# Patient Record
Sex: FEMALE | Race: OTHER | HISPANIC OR LATINO
[De-identification: names, ages, dates, MRNs, and addresses within clinical notes are randomized per-mention and may not be internally consistent; named-entity substitution may affect disease eponyms.]

---

## 2020-01-29 ENCOUNTER — RESULT REVIEW (OUTPATIENT)
Age: 70
End: 2020-01-29

## 2020-02-20 ENCOUNTER — APPOINTMENT (OUTPATIENT)
Dept: SURGERY | Facility: CLINIC | Age: 70
End: 2020-02-20
Payer: MEDICARE

## 2020-02-20 VITALS
BODY MASS INDEX: 22.99 KG/M2 | HEIGHT: 65 IN | OXYGEN SATURATION: 98 % | WEIGHT: 138 LBS | HEART RATE: 105 BPM | TEMPERATURE: 98.1 F | DIASTOLIC BLOOD PRESSURE: 84 MMHG | SYSTOLIC BLOOD PRESSURE: 145 MMHG

## 2020-02-20 DIAGNOSIS — Z78.9 OTHER SPECIFIED HEALTH STATUS: ICD-10-CM

## 2020-02-20 DIAGNOSIS — Z82.0 FAMILY HISTORY OF EPILEPSY AND OTHER DISEASES OF THE NERVOUS SYSTEM: ICD-10-CM

## 2020-02-20 DIAGNOSIS — N63.20 UNSPECIFIED LUMP IN THE LEFT BREAST, UNSPECIFIED QUADRANT: ICD-10-CM

## 2020-02-20 DIAGNOSIS — E78.00 PURE HYPERCHOLESTEROLEMIA, UNSPECIFIED: ICD-10-CM

## 2020-02-20 DIAGNOSIS — E07.9 DISORDER OF THYROID, UNSPECIFIED: ICD-10-CM

## 2020-02-20 DIAGNOSIS — Z82.49 FAMILY HISTORY OF ISCHEMIC HEART DISEASE AND OTHER DISEASES OF THE CIRCULATORY SYSTEM: ICD-10-CM

## 2020-02-20 PROBLEM — Z00.00 ENCOUNTER FOR PREVENTIVE HEALTH EXAMINATION: Status: ACTIVE | Noted: 2020-02-20

## 2020-02-20 PROCEDURE — 99203 OFFICE O/P NEW LOW 30 MIN: CPT

## 2020-02-20 RX ORDER — CALCIUM CARBONATE-CHOLECALCIFEROL TAB 250 MG-125 UNIT 250-125 MG-UNIT
TAB ORAL
Refills: 0 | Status: ACTIVE | COMMUNITY

## 2020-02-20 RX ORDER — LEVOTHYROXINE SODIUM 137 UG/1
TABLET ORAL
Refills: 0 | Status: ACTIVE | COMMUNITY

## 2020-02-20 RX ORDER — CHROMIUM 200 MCG
TABLET ORAL
Refills: 0 | Status: ACTIVE | COMMUNITY

## 2020-02-20 NOTE — HISTORY OF PRESENT ILLNESS
[de-identified] : Patient is a 69 year  year-old female  who was referred by Dr. Ilana Wei with the chief complaint of having a Left  breast mass for about 2-3 months. She  denies any trauma and She has no nipple discharge. She  denies any fever, night sweats or loss of appetite.    There is no family history of breast carcinoma.   Menarche at age 14 -0 para-0, and her last menstrual period was when she was 51 years old. Patient is on no hormonal replacement therapy.  A sono-guided biopsy was done on 2020 which the pathology stated  intraductal papilloma. \par

## 2020-02-20 NOTE — PHYSICAL EXAM
[Alert] : alert [Oriented to Person] : oriented to person [Oriented to Time] : oriented to time [Oriented to Place] : oriented to place [Calm] : calm [de-identified] :   anicteric.  Nasal mucosa pink, septum midline. Oral mucosa pink.  Tongue midline, Pharynx without exudates.\par   [de-identified] : She  is alert, well-groomed, and cheerful.\par   [de-identified] :  Neck supple. Trachea midline. Thyroid isthmus barely palpable, lobes not felt.\par   [de-identified] :  Left breast Bx site indurated. some ecchymosis around. No chest deformity. Breast are symmetric, Normal contours. No nodules, masses, tenderness, or axillary or supraclavicular  adenopathy. No nipple discharge. no skin retraction \par

## 2020-02-20 NOTE — PLAN
[FreeTextEntry1] : Ms. TONY  was told significance of findings, options, risks and benefits were explained.  Informed consent for excision of the left breast mass with spot localization and potential risks, benefits and alternatives (surgical options were discussed including non-surgical options or the option of no surgery) to the planned surgery were discussed in depth.  All surgical options were discussed including non-surgical treatments.  She wishes to proceed with surgery.  We will plan for surgery on at the next available date, pending any required insurance pre-certification or pre-approval. She agrees to obtain any necessary pre-operative evaluations and testing prior to surgery.\par Patient advised to seek immediate medical attention with any acute change in symptoms or with the development of any new or worsening symptoms.  Patient's questions and concerns addressed to patient's satisfaction, and patient verbalized an understanding of the information discussed.\par \par

## 2020-02-20 NOTE — DATA REVIEWED
[FreeTextEntry1] : JEANCARLOS TONY                      1\par \par \par \par Surgical Final Report\par \par \par \par \par Final Diagnosis\par Breast nodule, left, retroareolar, sonoguided core biopsy\par - Intraductal papilloma\par \par Verified by: Luis Enrique Dahl M.D.\par (Electronic Signature)\par Reported on: 01/31/20 13:48 EST, [address] [facility],\[place] [address]\par Phone: [phone]   Fax: [phone]\par _________________________________________________________________\par \par Clinical Information\par N63.0\par Sonoguided core biopsy of left breast retroareolar nodule

## 2020-02-20 NOTE — CONSULT LETTER
[Dear  ___] : Dear  [unfilled], [Please see my note below.] : Please see my note below. [Consult Letter:] : I had the pleasure of evaluating your patient, [unfilled]. [Sincerely,] : Sincerely, [FreeTextEntry3] : Isra Brown MD, FACS

## 2020-03-05 ENCOUNTER — OUTPATIENT (OUTPATIENT)
Dept: OUTPATIENT SERVICES | Facility: HOSPITAL | Age: 70
LOS: 1 days | End: 2020-03-05

## 2020-03-05 DIAGNOSIS — N63.0 UNSPECIFIED LUMP IN UNSPECIFIED BREAST: ICD-10-CM

## 2020-03-05 DIAGNOSIS — D36.9 BENIGN NEOPLASM, UNSPECIFIED SITE: ICD-10-CM

## 2020-03-10 ENCOUNTER — TRANSCRIPTION ENCOUNTER (OUTPATIENT)
Age: 70
End: 2020-03-10

## 2020-03-10 RX ORDER — CHLORHEXIDINE GLUCONATE 213 G/1000ML
1 SOLUTION TOPICAL ONCE
Refills: 0 | Status: DISCONTINUED | OUTPATIENT
Start: 2020-03-11 | End: 2020-03-19

## 2020-03-10 RX ORDER — SODIUM CHLORIDE 9 MG/ML
3 INJECTION INTRAMUSCULAR; INTRAVENOUS; SUBCUTANEOUS EVERY 8 HOURS
Refills: 0 | Status: DISCONTINUED | OUTPATIENT
Start: 2020-03-11 | End: 2020-03-11

## 2020-03-11 ENCOUNTER — OUTPATIENT (OUTPATIENT)
Dept: OUTPATIENT SERVICES | Facility: HOSPITAL | Age: 70
LOS: 1 days | End: 2020-03-11
Payer: COMMERCIAL

## 2020-03-11 ENCOUNTER — APPOINTMENT (OUTPATIENT)
Dept: SURGERY | Facility: HOSPITAL | Age: 70
End: 2020-03-11
Payer: MEDICARE

## 2020-03-11 ENCOUNTER — RESULT REVIEW (OUTPATIENT)
Age: 70
End: 2020-03-11

## 2020-03-11 VITALS
HEIGHT: 65 IN | WEIGHT: 136.91 LBS | DIASTOLIC BLOOD PRESSURE: 79 MMHG | OXYGEN SATURATION: 99 % | RESPIRATION RATE: 16 BRPM | TEMPERATURE: 98 F | SYSTOLIC BLOOD PRESSURE: 147 MMHG | HEART RATE: 89 BPM

## 2020-03-11 VITALS
SYSTOLIC BLOOD PRESSURE: 139 MMHG | TEMPERATURE: 98 F | RESPIRATION RATE: 16 BRPM | HEART RATE: 83 BPM | OXYGEN SATURATION: 100 % | DIASTOLIC BLOOD PRESSURE: 63 MMHG

## 2020-03-11 DIAGNOSIS — Z90.710 ACQUIRED ABSENCE OF BOTH CERVIX AND UTERUS: Chronic | ICD-10-CM

## 2020-03-11 DIAGNOSIS — D36.9 BENIGN NEOPLASM, UNSPECIFIED SITE: ICD-10-CM

## 2020-03-11 DIAGNOSIS — K08.89 OTHER SPECIFIED DISORDERS OF TEETH AND SUPPORTING STRUCTURES: ICD-10-CM

## 2020-03-11 DIAGNOSIS — N63.0 UNSPECIFIED LUMP IN UNSPECIFIED BREAST: ICD-10-CM

## 2020-03-11 DIAGNOSIS — Z98.890 OTHER SPECIFIED POSTPROCEDURAL STATES: Chronic | ICD-10-CM

## 2020-03-11 DIAGNOSIS — E03.9 HYPOTHYROIDISM, UNSPECIFIED: ICD-10-CM

## 2020-03-11 PROCEDURE — 88307 TISSUE EXAM BY PATHOLOGIST: CPT

## 2020-03-11 PROCEDURE — 76098 X-RAY EXAM SURGICAL SPECIMEN: CPT

## 2020-03-11 PROCEDURE — 19281 PERQ DEVICE BREAST 1ST IMAG: CPT | Mod: LT

## 2020-03-11 PROCEDURE — 76098 X-RAY EXAM SURGICAL SPECIMEN: CPT | Mod: 26

## 2020-03-11 PROCEDURE — 19281 PERQ DEVICE BREAST 1ST IMAG: CPT

## 2020-03-11 PROCEDURE — 88307 TISSUE EXAM BY PATHOLOGIST: CPT | Mod: 26

## 2020-03-11 PROCEDURE — 19125 EXCISION BREAST LESION: CPT | Mod: LT

## 2020-03-11 RX ORDER — ACETAMINOPHEN 500 MG
2 TABLET ORAL
Qty: 0 | Refills: 0 | DISCHARGE

## 2020-03-11 RX ORDER — LEVOTHYROXINE SODIUM 125 MCG
1 TABLET ORAL
Qty: 0 | Refills: 0 | DISCHARGE

## 2020-03-11 NOTE — H&P PST ADULT - GASTROINTESTINAL DETAILS
no bruit/no rebound tenderness/no distention/normal/no masses palpable/nontender/soft/bowel sounds normal

## 2020-03-11 NOTE — H&P PST ADULT - RS GEN PE MLT RESP DETAILS PC
no rhonchi/clear to auscultation bilaterally/airway patent/breath sounds equal/good air movement/no rales/no wheezes

## 2020-03-11 NOTE — ASU DISCHARGE PLAN (ADULT/PEDIATRIC) - CALL YOUR DOCTOR IF YOU HAVE ANY OF THE FOLLOWING:
Fever greater than (need to indicate Fahrenheit or Celsius)/Bleeding that does not stop/Swelling that gets worse Bleeding that does not stop/Swelling that gets worse/Fever greater than (need to indicate Fahrenheit or Celsius)/Wound/Surgical Site with redness, or foul smelling discharge or pus

## 2020-03-11 NOTE — H&P PST ADULT - NSICDXPASTSURGICALHX_GEN_ALL_CORE_FT
PAST SURGICAL HISTORY:  S/P total hysterectomy 2001    Status post lung surgery s/p benign mass right larteral side of lung- 2002

## 2020-03-11 NOTE — H&P PST ADULT - NEGATIVE NEUROLOGICAL SYMPTOMS
no hemiparesis/no facial palsy/no paresthesias/no transient paralysis/no weakness/no tremors/no headache/no confusion/no loss of sensation/no difficulty walking/no focal seizures/no syncope/no generalized seizures/no loss of consciousness

## 2020-03-11 NOTE — H&P PST ADULT - NEGATIVE GENERAL GENITOURINARY SYMPTOMS
no hematuria/no incontinence/no nocturia/no dysuria/no urinary hesitancy/normal urinary frequency/no renal colic

## 2020-03-11 NOTE — H&P PST ADULT - HISTORY OF PRESENT ILLNESS
69 years old female presented to [facility] for evaluation before surgery pt was diagnosed with Benign neoplasm, unspecified site  , unspecified lump in breast and is scheduled for excision of left breast mass with spot localization on 3/11/2020.

## 2020-03-11 NOTE — H&P PST ADULT - NSICDXPASTMEDICALHX_GEN_ALL_CORE_FT
PAST MEDICAL HISTORY:  Benign neoplasm, unspecified site     Fatty liver     Fibroids hx    Hypothyroidism     Mass of right lung hx of - sx 2002    Osteoporosis     Tooth loose pt has left lower front loose tooth, DR. Heath Anesthesia notified, pt understands risk assocoated with sx with loose tooth im mouth that can loose this tooth dutring intubation and has choice also to see dentist before surgery -  preferrablechoice is to see dentist surgery is today ) for ectraction , pt verbalized understanding    Unspecified lump in unspecified breast

## 2020-03-11 NOTE — H&P PST ADULT - ASSESSMENT
69 years old female presented with Benign neoplasm, unspecified site  , unspecified lump in breast ( left )

## 2020-03-11 NOTE — H&P PST ADULT - NEGATIVE CARDIOVASCULAR SYMPTOMS
no dyspnea on exertion/no chest pain/no peripheral edema/no claudication/no orthopnea/no paroxysmal nocturnal dyspnea/no palpitations

## 2020-03-11 NOTE — H&P PST ADULT - DENTITION
upper and lower partial dentures, missing teeth, , lower left loose tooth to notify OR booking and Anesthesia Dr. Joe aware

## 2020-03-11 NOTE — ASU DISCHARGE PLAN (ADULT/PEDIATRIC) - CARE PROVIDER_API CALL
Isra Brown)  Surgery  [address], [place] [address]  Phone: [phone]  Fax: [phone]  Follow Up Time:

## 2020-03-11 NOTE — H&P PST ADULT - NEGATIVE BREAST SYMPTOMS
no breast lump R/no breast tenderness R/no nipple discharge L/no breast tenderness L/no nipple discharge R

## 2020-03-11 NOTE — H&P PST ADULT - NEGATIVE GASTROINTESTINAL SYMPTOMS
no vomiting/no diarrhea/no flatulence/no nausea/no change in bowel habits/no constipation/no abdominal pain

## 2020-03-11 NOTE — H&P PST ADULT - NSICDXPROBLEM_GEN_ALL_CORE_FT
PROBLEM DIAGNOSES  Problem: Hypothyroidism  Assessment and Plan: Continue Levothyroxine, pt took it with sips of water on day of surgery. Follow-up with provider postop for management.     Problem: Tooth loose  Assessment and Plan: pt has left lower front loose tooth, DR. Heath Anesthesia notified, pt understands risk assocoated with sx with loose tooth im mouth that can loose this tooth dutring intubation and has choice also to see dentist before surgery -  preferrablechoice is to see dentist surgery is today ) for ectraction , pt verbalized understanding    Problem: Unspecified lump in unspecified breast  Assessment and Plan: Patient is scheduled for excision of left breast mass with spot localization on 3/11/2020.    Problem: Benign neoplasm, unspecified site  Assessment and Plan: Patient is scheduled for excision of left breast mass with spot localization on 3/11/2020.

## 2020-03-12 PROBLEM — D36.9 BENIGN NEOPLASM, UNSPECIFIED SITE: Chronic | Status: ACTIVE | Noted: 2020-03-11

## 2020-03-12 PROBLEM — D21.9 BENIGN NEOPLASM OF CONNECTIVE AND OTHER SOFT TISSUE, UNSPECIFIED: Chronic | Status: ACTIVE | Noted: 2020-03-11

## 2020-03-12 PROBLEM — M81.0 AGE-RELATED OSTEOPOROSIS WITHOUT CURRENT PATHOLOGICAL FRACTURE: Chronic | Status: ACTIVE | Noted: 2020-03-11

## 2020-03-12 PROBLEM — N63.0 UNSPECIFIED LUMP IN UNSPECIFIED BREAST: Chronic | Status: ACTIVE | Noted: 2020-03-11

## 2020-03-12 PROBLEM — E03.9 HYPOTHYROIDISM, UNSPECIFIED: Chronic | Status: ACTIVE | Noted: 2020-03-11

## 2020-03-12 PROBLEM — K08.89 OTHER SPECIFIED DISORDERS OF TEETH AND SUPPORTING STRUCTURES: Chronic | Status: ACTIVE | Noted: 2020-03-11

## 2020-03-12 PROBLEM — K76.0 FATTY (CHANGE OF) LIVER, NOT ELSEWHERE CLASSIFIED: Chronic | Status: ACTIVE | Noted: 2020-03-11

## 2020-03-12 PROBLEM — R91.8 OTHER NONSPECIFIC ABNORMAL FINDING OF LUNG FIELD: Chronic | Status: ACTIVE | Noted: 2020-03-11

## 2020-03-16 LAB — SURGICAL PATHOLOGY STUDY: SIGNIFICANT CHANGE UP

## 2020-03-23 ENCOUNTER — APPOINTMENT (OUTPATIENT)
Dept: SURGERY | Facility: CLINIC | Age: 70
End: 2020-03-23
Payer: MEDICARE

## 2020-03-23 PROCEDURE — 99024 POSTOP FOLLOW-UP VISIT: CPT

## 2020-03-23 NOTE — PHYSICAL EXAM
[Calm] : calm [de-identified] : She  is alert, well-groomed, and cheerful.\par   [de-identified] : left breast Surgical wound is healing well.   no signs of  inflammation or infection.

## 2020-03-23 NOTE — DATA REVIEWED
[FreeTextEntry1] : JEANCARLOS TONY                      2\par \par \par \par Surgical Final Report\par \par \par \par \par Final Diagnosis\par \par Mass, left breast; excision with needle guided localization:\par - Intraductal papilloma.\par - Tissue changes consistent with previous surgical intervention.\par - Fibrocystic change harboring microcalcifications and including\par flat and micropapillary ductal epithelial\par hyperplasia, nodular adenosis, columnar cell change, stromal\par fibrosis and cysts.\par \par Verified by: Sultana Caro MD\par (Electronic Signature)\par Reported on: 03/16/20 22:46 EDT, [facility],\par [address], [place] [address]\par Phone: [phone]   Fax: [phone]\par _________________________________________________________________\par \par Clinical History\par Left breast mass\par Left breast preop needle localization\par Excision left breast mass

## 2020-03-23 NOTE — ASSESSMENT
[FreeTextEntry1] : Ms. TONY is doing well, with excellent post-operative recovery. The surgical incision is healing well and as expected. There is no evidence of infection or complication, and patient is progressing as expected. Post-operative wound care, activity, restrictions and precautions reinforced.  Pathology results were discussed in details. Patient's questions and concerns addressed to patient's satisfaction.\par

## 2020-03-23 NOTE — HISTORY OF PRESENT ILLNESS
[de-identified] : Ms. TONY  is s/p Excision left breast mass with spot localization on 03/11/2020. Patient's pathology results were  consistent with  Intraductal papilloma. Today  Ms. TONY offers no complaints. patient reports no fever, chills,  or  pain.  Her surgical wound is healing well. No signs of inflammation, infection or exudate. \par

## 2020-07-20 ENCOUNTER — APPOINTMENT (OUTPATIENT)
Dept: SURGERY | Facility: CLINIC | Age: 70
End: 2020-07-20
Payer: MEDICARE

## 2020-07-20 VITALS
DIASTOLIC BLOOD PRESSURE: 80 MMHG | WEIGHT: 141 LBS | HEART RATE: 81 BPM | BODY MASS INDEX: 23.49 KG/M2 | SYSTOLIC BLOOD PRESSURE: 158 MMHG | HEIGHT: 65 IN

## 2020-07-20 PROCEDURE — 99213 OFFICE O/P EST LOW 20 MIN: CPT

## 2020-07-20 NOTE — PHYSICAL EXAM
[Alert] : alert [Oriented to Place] : oriented to place [Oriented to Person] : oriented to person [Calm] : calm [Oriented to Time] : oriented to time [de-identified] : She  is alert, well-groomed, and cheerful.\par   [de-identified] :   anicteric.  Nasal mucosa pink, septum midline. Oral mucosa pink.  Tongue midline, Pharynx without exudates.\par   [de-identified] : left breast Surgical wound healed well. No chest deformity. Breast are symmetric, Normal contours. No nodules, masses, tenderness, or axillary or supraclavicular  adenopathy. No nipple discharge. no skin retraction \par

## 2020-07-20 NOTE — PLAN
[FreeTextEntry1] : Ms. TONY  is presenting  today for an evaluation .  she is doing well and offers no complaints.  Results of  her recent  imaging and physical examination findings were discussed in details.   She  was advised to have BL      breast US and Mammogram in  November 2020 and return after the tests. Importance of monthly self-breast examination was reinforced.  Patient's questions and concerns addressed to patient's satisfaction.\par

## 2020-07-20 NOTE — HISTORY OF PRESENT ILLNESS
[de-identified] : Ms. TONY  is s/p Excision left breast mass with spot localization on 03/11/2020. Patient's pathology results were  consistent with  Intraductal papilloma.  she is presenting for a follow up exam. Ms. TONY denies any trauma and she has no nipple discharge. Patient denies any fever, night sweats or loss of appetite. There is  family history of breast carcinoma in her cousin. Patient is on no hormonal replacement therapy.  [de-identified] : Patient reports no interval changes to her overall health status or medical history

## 2021-01-26 PROBLEM — Z12.39 SCREENING BREAST EXAMINATION: Status: ACTIVE | Noted: 2021-01-26

## 2021-02-01 ENCOUNTER — APPOINTMENT (OUTPATIENT)
Dept: SURGERY | Facility: CLINIC | Age: 71
End: 2021-02-01
Payer: MEDICARE

## 2021-02-01 DIAGNOSIS — Z12.39 ENCOUNTER FOR OTHER SCREENING FOR MALIGNANT NEOPLASM OF BREAST: ICD-10-CM

## 2021-02-04 ENCOUNTER — APPOINTMENT (OUTPATIENT)
Dept: SURGERY | Facility: CLINIC | Age: 71
End: 2021-02-04
Payer: MEDICARE

## 2021-02-18 ENCOUNTER — APPOINTMENT (OUTPATIENT)
Dept: SURGERY | Facility: CLINIC | Age: 71
End: 2021-02-18
Payer: MEDICARE

## 2021-02-18 VITALS
HEIGHT: 65 IN | BODY MASS INDEX: 22.99 KG/M2 | SYSTOLIC BLOOD PRESSURE: 154 MMHG | HEART RATE: 102 BPM | WEIGHT: 138 LBS | DIASTOLIC BLOOD PRESSURE: 77 MMHG

## 2021-02-18 DIAGNOSIS — D36.9 BENIGN NEOPLASM, UNSPECIFIED SITE: ICD-10-CM

## 2021-02-18 PROCEDURE — 99072 ADDL SUPL MATRL&STAF TM PHE: CPT

## 2021-02-18 PROCEDURE — 99213 OFFICE O/P EST LOW 20 MIN: CPT

## 2021-02-18 NOTE — ASSESSMENT
[FreeTextEntry1] : Impression: left breast Surgical wound healed well. No chest deformity. Breast are symmetric, Normal contours. No nodules, masses, tenderness, or axillary or supraclavicular adenopathy. No nipple discharge. no skin retraction

## 2021-02-18 NOTE — HISTORY OF PRESENT ILLNESS
[de-identified] : This is  a 70 year   old patient presenting today for a breast exam and to discuss the results of her recent  breast imaging. Patient had a BL breast US and   BL breast Mammogram on 11/17/2020 Deemed BIRADS category 2.   Ms. TONY denies any trauma and she has no nipple discharge. Patient denies any fever, night sweats or loss of appetite.  \par PERTINENT HISTORY: \par Ms. TONY  is s/p Excision left breast mass with spot localization on 03/11/2020. Patient's pathology results were  consistent with  Intraductal papilloma.   There is  family history of breast carcinoma in her cousin. Patient is on no hormonal replacement therapy.  [de-identified] :  Patient reports no interval changes to her overall health status or medical history

## 2021-02-18 NOTE — PLAN
[FreeTextEntry1] : Ms. TONY  is presenting  today for an evaluation .  she is doing well and offers no complaints.  Results of  her recent  imaging and physical examination findings were discussed in details.   She  was advised to have BL       breast US and Mammogram in  November 2021 and return after the tests. Importance of monthly self-breast examination was reinforced.  Patient's questions and concerns addressed to patient's satisfaction.\par

## 2021-02-18 NOTE — PHYSICAL EXAM
[Alert] : alert [Oriented to Person] : oriented to person [Oriented to Place] : oriented to place [Oriented to Time] : oriented to time [Calm] : calm [de-identified] : She  is alert, well-groomed, and cheerful.\par   [de-identified] : anicteric.  Nasal mucosa pink, septum midline. Oral mucosa pink.  Tongue midline, Pharynx without exudates.\par   [de-identified] : Neck supple. Trachea midline. Thyroid isthmus barely palpable, lobes not felt.\par   [de-identified] : left breast Surgical wound healed well. No chest deformity. Breast are symmetric, Normal contours. No nodules, masses, tenderness, or axillary or supraclavicular adenopathy. No nipple discharge. no skin retraction

## 2022-10-12 NOTE — H&P PST ADULT - NEGATIVE OPHTHALMOLOGIC SYMPTOMS
no blurred vision L/no blurred vision R/no diplopia/no photophobia/no irritation R/no lacrimation L/no lacrimation R/no irritation L/no loss of vision L no

## 2025-05-07 ENCOUNTER — APPOINTMENT (OUTPATIENT)
Age: 75
End: 2025-05-07
Payer: MEDICARE

## 2025-05-07 ENCOUNTER — NON-APPOINTMENT (OUTPATIENT)
Age: 75
End: 2025-05-07

## 2025-05-07 VITALS
HEIGHT: 65 IN | DIASTOLIC BLOOD PRESSURE: 83 MMHG | WEIGHT: 138 LBS | SYSTOLIC BLOOD PRESSURE: 144 MMHG | OXYGEN SATURATION: 99 % | BODY MASS INDEX: 22.99 KG/M2 | HEART RATE: 93 BPM

## 2025-05-07 DIAGNOSIS — M81.0 AGE-RELATED OSTEOPOROSIS W/OUT CURRENT PATHOLOGICAL FRACTURE: ICD-10-CM

## 2025-05-07 DIAGNOSIS — M47.816 SPONDYLOSIS W/OUT MYELOPATHY OR RADICULOPATHY, LUMBAR REGION: ICD-10-CM

## 2025-05-07 PROCEDURE — 73521 X-RAY EXAM HIPS BI 2 VIEWS: CPT

## 2025-05-07 PROCEDURE — 99204 OFFICE O/P NEW MOD 45 MIN: CPT

## 2025-05-07 PROCEDURE — 72100 X-RAY EXAM L-S SPINE 2/3 VWS: CPT

## 2025-05-07 RX ORDER — ALENDRONATE SODIUM 70 MG/1
TABLET ORAL
Refills: 0 | Status: ACTIVE | COMMUNITY

## 2025-05-07 RX ORDER — CELECOXIB 200 MG/1
200 CAPSULE ORAL
Qty: 30 | Refills: 1 | Status: ACTIVE | COMMUNITY
Start: 2025-05-07 | End: 1900-01-01

## 2025-05-07 RX ORDER — ATORVASTATIN CALCIUM 80 MG/1
TABLET, FILM COATED ORAL
Refills: 0 | Status: ACTIVE | COMMUNITY

## 2025-05-07 RX ORDER — LEVOTHYROXINE SODIUM 0.17 MG/1
TABLET ORAL
Refills: 0 | Status: ACTIVE | COMMUNITY

## 2025-07-07 ENCOUNTER — RX RENEWAL (OUTPATIENT)
Age: 75
End: 2025-07-07

## 2025-08-07 ENCOUNTER — APPOINTMENT (OUTPATIENT)
Age: 75
End: 2025-08-07
Payer: MEDICARE

## 2025-08-07 VITALS
BODY MASS INDEX: 22.99 KG/M2 | HEIGHT: 65 IN | HEART RATE: 78 BPM | SYSTOLIC BLOOD PRESSURE: 145 MMHG | DIASTOLIC BLOOD PRESSURE: 80 MMHG | WEIGHT: 138 LBS | OXYGEN SATURATION: 98 %

## 2025-08-07 DIAGNOSIS — M47.816 SPONDYLOSIS W/OUT MYELOPATHY OR RADICULOPATHY, LUMBAR REGION: ICD-10-CM

## 2025-08-07 PROCEDURE — 99213 OFFICE O/P EST LOW 20 MIN: CPT

## 2025-09-08 ENCOUNTER — RX RENEWAL (OUTPATIENT)
Age: 75
End: 2025-09-08